# Patient Record
Sex: MALE | Race: AMERICAN INDIAN OR ALASKA NATIVE
[De-identification: names, ages, dates, MRNs, and addresses within clinical notes are randomized per-mention and may not be internally consistent; named-entity substitution may affect disease eponyms.]

---

## 2017-11-18 ENCOUNTER — HOSPITAL ENCOUNTER (EMERGENCY)
Dept: HOSPITAL 5 - ED | Age: 52
Discharge: LEFT BEFORE DISCHARGE | End: 2017-11-18
Payer: OTHER GOVERNMENT

## 2017-11-18 VITALS — SYSTOLIC BLOOD PRESSURE: 131 MMHG | DIASTOLIC BLOOD PRESSURE: 74 MMHG

## 2017-11-18 DIAGNOSIS — E78.00: ICD-10-CM

## 2017-11-18 DIAGNOSIS — L03.011: Primary | ICD-10-CM

## 2017-11-18 DIAGNOSIS — I10: ICD-10-CM

## 2017-11-18 LAB
ANION GAP SERPL CALC-SCNC: 18 MMOL/L
BASOPHILS NFR BLD AUTO: 0.9 % (ref 0–1.8)
BUN SERPL-MCNC: 8 MG/DL (ref 9–20)
BUN/CREAT SERPL: 16 %
CALCIUM SERPL-MCNC: 8.7 MG/DL (ref 8.4–10.2)
CHLORIDE SERPL-SCNC: 101.2 MMOL/L (ref 98–107)
CO2 SERPL-SCNC: 24 MMOL/L (ref 22–30)
EOSINOPHIL NFR BLD AUTO: 3.1 % (ref 0–4.3)
GLUCOSE SERPL-MCNC: 86 MG/DL (ref 75–100)
HCT VFR BLD CALC: 38.2 % (ref 35.5–45.6)
HGB BLD-MCNC: 12.7 GM/DL (ref 11.8–15.2)
MCH RBC QN AUTO: 30 PG (ref 28–32)
MCHC RBC AUTO-ENTMCNC: 33 % (ref 32–34)
MCV RBC AUTO: 89 FL (ref 84–94)
PLATELET # BLD: 308 K/MM3 (ref 140–440)
POTASSIUM SERPL-SCNC: 3.8 MMOL/L (ref 3.6–5)
RBC # BLD AUTO: 4.28 M/MM3 (ref 3.65–5.03)
SODIUM SERPL-SCNC: 139 MMOL/L (ref 137–145)
WBC # BLD AUTO: 6.9 K/MM3 (ref 4.5–11)

## 2017-11-18 PROCEDURE — 85025 COMPLETE CBC W/AUTO DIFF WBC: CPT

## 2017-11-18 PROCEDURE — 90471 IMMUNIZATION ADMIN: CPT

## 2017-11-18 PROCEDURE — 87040 BLOOD CULTURE FOR BACTERIA: CPT

## 2017-11-18 PROCEDURE — 82140 ASSAY OF AMMONIA: CPT

## 2017-11-18 PROCEDURE — 80048 BASIC METABOLIC PNL TOTAL CA: CPT

## 2017-11-18 PROCEDURE — 85652 RBC SED RATE AUTOMATED: CPT

## 2017-11-18 PROCEDURE — 90715 TDAP VACCINE 7 YRS/> IM: CPT

## 2017-11-18 PROCEDURE — 36415 COLL VENOUS BLD VENIPUNCTURE: CPT

## 2017-11-18 PROCEDURE — 86140 C-REACTIVE PROTEIN: CPT

## 2017-11-18 NOTE — XRAY REPORT
RIGHT HAND, 3 views:



History: Right thumb injury, pain and swelling.



The bony architecture is intact. No acute fracture is detected. 

Apparent chronic bony injury to the proximal phalanx of the fourth 

digit is noted. Bony alignment is normal. No

soft tissue abnormalities are seen.  The joint spaces appear

preserved.



IMPRESSION:

No acute injury is appreciated.

## 2017-11-18 NOTE — EMERGENCY DEPARTMENT REPORT
Upper Extremity





- HPI


Chief Complaint: Extremity Injury, Upper


Stated Complaint: INFECTION R THUMB


Time Seen by Provider: 11/18/17 11:29


Upper Extremity: Right Thumb


Occurred When: >5 Days (7 days)


Mechanism: Crush


Severity: moderate


Symptoms: Yes Pain with Movement, Yes Swelling, Yes Bruising/Ecchymosis


Other History: 52-year-old male past medical history hypertension presents with 

pus and drainage from right thumb finger pad extending down through the right 

thumb.  Patient denies fever or chills.  States that he was working with types 

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 

thumb which has subsequently become more painful and swollen over the last 

week.  Patient unaware of tetanus status.  Is able to range thumb but it is 

severely swollen.





ED Review of Systems


ROS: 


Stated complaint: INFECTION R THUMB


Other details as noted in HPI





Constitutional: denies: chills, fever


Eyes: denies: eye pain, eye discharge, vision change


ENT: denies: ear pain, throat pain


Respiratory: denies: cough, shortness of breath, wheezing


Cardiovascular: denies: chest pain, palpitations


Endocrine: no symptoms reported


Gastrointestinal: denies: abdominal pain, nausea, diarrhea


Genitourinary: denies: urgency, dysuria


Musculoskeletal: as per HPI (swollen right thumb).  denies: back pain, joint 

swelling, arthralgia


Skin: denies: rash, lesions


Neurological: denies: headache, weakness, paresthesias


Psychiatric: denies: anxiety, depression


Hematological/Lymphatic: denies: easy bleeding, easy bruising





ED Past Medical Hx





- Past Medical History


Previous Medical History?: Yes


Hx Hypertension: Yes (controlled with diet and exercise)


Additional medical history: high cholesterol





- Surgical History


Past Surgical History?: Yes


Additional Surgical History: 2 right knee surgeries.





- Social History


Smoking Status: Never Smoker


Substance Use Type: None





- Medications


Home Medications: 


 Home Medications











 Medication  Instructions  Recorded  Confirmed  Last Taken  Type


 


Atorvastatin [Lipitor] 40 mg PO QHS 12/28/13 12/28/13 12/27/13 History


 


Cyclobenzaprine HCl [Flexeril 5mg] 5 mg PO BID PRN #15 tablet 07/07/14  Unknown 

Rx


 


Meloxicam [Mobic] 7.5 mg PO QDAY PRN #20 tablet 07/07/14  Unknown Rx


 


Cephalexin [Keflex] 500 mg PO Q12HR #14 cap 11/18/17  Unknown Rx


 


Ibuprofen [Motrin] 800 mg PO Q8HR PRN #30 tablet 11/18/17  Unknown Rx


 


Sulfamethoxazole/Trimethoprim 1 each PO BID #14 tablet 11/18/17  Unknown Rx





[Bactrim DS TAB]     














Upper Extremity Exam





- Exam


General: 


Vital signs noted. No distress. Alert and acting appropriately.





Head and Torso: No HEENT Abnormality, No Neck Tenderness, No Chest/Lungs 

Abnormality, No Abdominal Tenderness, No Back Tenderness


Shoulder Exam: Yes Normal Range of Motion in Shoulder, No Shoulder Tenderness, 

No Clavicle Tenderness, No Shoulder Deformity, No AC Joint Tenderness


Arm Exam: No Arm/Humerus Tenderness, No Arm Deformity


Elbow: No Elbow Tenderness, No Normal Range of Motion in Elbow, No Elbow 

Deformity


Forearm: No Forearm Tenderness, No Forearm Deformity, No Pain with Pronation, 

No Pain with Supination


Wrist: Yes Normal ROM in Wrist, No Wrist Tenderness, No Wrist Deformity, No 

Snuffbox Tenderness, No Pain with Axial Thumb Compression


Hand: Yes Digit Tenderness (right thumb erythema and fluctuance and tenderness 

on the palmar side between the DIP and MCP), No Hand Tenderness, No Hand 

Deformity, No Normal ROM in Digit(s) (Limited thumb flexion), No Digit(s) 

Deformity, No Tendon Dysfunction


CMS Exam: Yes Normal Distal Pulses (less than 1 second), Yes Normal Capillary 

Refill, Yes Normal Distal Sensation, No Broken Skin


Hand L/R Front: 


  __________________________














  __________________________





 1 - Pain swelling, erythema and fluctuance here








ED Course


 Vital Signs











  11/18/17





  10:25


 


Temperature 98.5 F


 


Pulse Rate 63


 


Respiratory 18





Rate 


 


Blood Pressure 131/74


 


O2 Sat by Pulse 97





Oximetry 














ED Medical Decision Making





- Lab Data


Result diagrams: 


 11/18/17 12:23





 11/18/17 12:23





- Medical Decision Making


A/P: Right thumb felon


1-Bactrim and Keflex


2- discussed with Dr. Rodgers


3-I advised patient that he would have to be transferred to a facility with 

orthopedic hand surgeon to have felon properly incised and drain.  Patient 

elected to leave the hospital before I could accomplish this.  I advised the 

patient's return as soon as possible or to go to a hospital that has hand or 

orthopedic surgery on call in order to have procedure done as infection likely 

spread and worsened without drainage of fluctuance inside finger.  Patient 

understood my instructions clearly and signed out AGAINST MEDICAL ADVICE.  I 

advised the patient that he is at risk of permanent disability and/or 

amputation of finger if infection progresses to the bone and that he is also at 

risk of sepsis and death.  Patient understood this conversation and stated he 

would follow up ASAP. 


4- Motrin when necessary


Critical care attestation.: 


If time is entered above; I have spent that time in minutes in the direct care 

of this critically ill patient, excluding procedure time.








ED Disposition


Clinical Impression: 


 Felon of finger of right hand, Left against medical advice





Disposition: DC-07 LEFT AGAINST MED ADVICE


Is pt being admited?: No


Does the pt Need Aspirin: No


Condition: Stable


Instructions:  Cellulitis (ED), Abscess (ED)


Additional Instructions: 


I advised patient to seek medical attention as soon as possible and advised him 

that he needs to see an orthopedic physician or hand surgeon for incision of 

his finger infection.  Patient understood my instructions.


Prescriptions: 


Cephalexin [Keflex] 500 mg PO Q12HR #14 cap


Ibuprofen [Motrin] 800 mg PO Q8HR PRN #30 tablet


 PRN Reason: Pain


Sulfamethoxazole/Trimethoprim [Bactrim DS TAB] 1 each PO BID #14 tablet


Referrals: 


[facility] [Provider Group] - 3-5 Days


LEI BRUNO MD [Staff Physician] - 3-5 Days


Forms:  AMA Form


Time of Disposition: 13:51

## 2020-01-30 ENCOUNTER — HOSPITAL ENCOUNTER (EMERGENCY)
Dept: HOSPITAL 5 - ED | Age: 55
Discharge: HOME | End: 2020-01-30
Payer: OTHER GOVERNMENT

## 2020-01-30 VITALS — DIASTOLIC BLOOD PRESSURE: 97 MMHG | SYSTOLIC BLOOD PRESSURE: 152 MMHG

## 2020-01-30 DIAGNOSIS — I10: ICD-10-CM

## 2020-01-30 DIAGNOSIS — Z87.891: ICD-10-CM

## 2020-01-30 DIAGNOSIS — F12.10: ICD-10-CM

## 2020-01-30 DIAGNOSIS — L03.211: ICD-10-CM

## 2020-01-30 DIAGNOSIS — K04.7: Primary | ICD-10-CM

## 2020-01-30 DIAGNOSIS — E78.00: ICD-10-CM

## 2020-01-30 DIAGNOSIS — Z79.899: ICD-10-CM

## 2020-01-30 NOTE — EMERGENCY DEPARTMENT REPORT
ED ENT HPI





- General


Chief complaint: Abdominal Pain


Stated complaint: ALLERGIC REACTION,CRAMPS IN STOMACH


Time Seen by Provider: 01/30/20 08:40


Source: patient


Mode of arrival: Ambulatory


Limitations: No Limitations





- History of Present Illness


Initial comments: 





patient is a 55-year-old male presents emergency room with complaints of a 

dental abscess to the left upper jaw that began a week ago.  Patient states that

he went to the dentist 8 days ago and had a couple root canals performed.  He 

states that he has one root canal left that needs to be completed but was not 

able to complete it that day secondary to a becoming uncomfortable.  States that

the dentist placed him on penicillin, Motrin, hydrocodone.  He states during the

last couple days he has now noticed left-sided facial swelling which he states 

he did not have before.  Patient states that he went to [facility] urgent care 

last night and was given a ceftriaxone injection but they did not change any of 

his other medications.  This morning when he woke up he took a penicillin on 

empty stomach and then began having nausea, feeling overheated, having stomach 

cramps.  He states he is not having no symptoms currently.  he states that the 

facial swelling has not been improving. Past medical history of hypertension.  

He denies any known allergies.





- Related Data


                                Home Medications











 Medication  Instructions  Recorded  Confirmed  Last Taken


 


Atorvastatin [Lipitor] 40 mg PO QHS 12/28/13 12/28/13 12/27/13








                                  Previous Rx's











 Medication  Instructions  Recorded  Last Taken  Type


 


Cyclobenzaprine HCl [Flexeril 5mg] 5 mg PO BID PRN #15 tablet 07/07/14 Unknown 

Rx


 


Meloxicam [Mobic] 7.5 mg PO QDAY PRN #20 tablet 07/07/14 Unknown Rx


 


Ibuprofen [Motrin] 800 mg PO Q8HR PRN #30 tablet 11/18/17 Unknown Rx


 


Sulfamethoxazole/Trimethoprim 1 each PO BID #14 tablet 11/18/17 Unknown Rx





[Bactrim DS TAB]    


 


cephALEXin [Keflex] 500 mg PO Q12HR #14 cap 11/18/17 Unknown Rx


 


Clindamycin [Clindamycin CAP] 450 mg PO TID 7 Days #63 capsule 01/30/20 Unknown 

Rx











                                    Allergies











Allergy/AdvReac Type Severity Reaction Status Date / Time


 


No Known Allergies Allergy   Unverified 12/28/13 09:36














ED Dental HPI





- General


Chief complaint: Abdominal Pain


Stated complaint: ALLERGIC REACTION,CRAMPS IN STOMACH


Time Seen by Provider: 01/30/20 08:40


Source: patient


Mode of arrival: Ambulatory


Limitations: No Limitations





- Related Data


                                Home Medications











 Medication  Instructions  Recorded  Confirmed  Last Taken


 


Atorvastatin [Lipitor] 40 mg PO QHS 12/28/13 12/28/13 12/27/13








                                  Previous Rx's











 Medication  Instructions  Recorded  Last Taken  Type


 


Cyclobenzaprine HCl [Flexeril 5mg] 5 mg PO BID PRN #15 tablet 07/07/14 Unknown 

Rx


 


Meloxicam [Mobic] 7.5 mg PO QDAY PRN #20 tablet 07/07/14 Unknown Rx


 


Ibuprofen [Motrin] 800 mg PO Q8HR PRN #30 tablet 11/18/17 Unknown Rx


 


Sulfamethoxazole/Trimethoprim 1 each PO BID #14 tablet 11/18/17 Unknown Rx





[Bactrim DS TAB]    


 


cephALEXin [Keflex] 500 mg PO Q12HR #14 cap 11/18/17 Unknown Rx


 


Clindamycin [Clindamycin CAP] 450 mg PO TID 7 Days #63 capsule 01/30/20 Unknown 

Rx











                                    Allergies











Allergy/AdvReac Type Severity Reaction Status Date / Time


 


No Known Allergies Allergy   Unverified 12/28/13 09:36














ED Review of Systems


ROS: 


Stated complaint: ALLERGIC REACTION,CRAMPS IN STOMACH


Other details as noted in HPI





Comment: All other systems reviewed and negative





ED Past Medical Hx





- Past Medical History


Previous Medical History?: Yes


Hx Hypertension: Yes (controlled with diet and exercise)


Additional medical history: high cholesterol





- Surgical History


Past Surgical History?: Yes


Additional Surgical History: 2 right knee surgeries.





- Social History


Smoking Status: Former Smoker


Substance Use Type: Marijuana





- Medications


Home Medications: 


                                Home Medications











 Medication  Instructions  Recorded  Confirmed  Last Taken  Type


 


Atorvastatin [Lipitor] 40 mg PO QHS 12/28/13 12/28/13 12/27/13 History


 


Cyclobenzaprine HCl [Flexeril 5mg] 5 mg PO BID PRN #15 tablet 07/07/14  Unknown 

Rx


 


Meloxicam [Mobic] 7.5 mg PO QDAY PRN #20 tablet 07/07/14  Unknown Rx


 


Ibuprofen [Motrin] 800 mg PO Q8HR PRN #30 tablet 11/18/17  Unknown Rx


 


Sulfamethoxazole/Trimethoprim 1 each PO BID #14 tablet 11/18/17  Unknown Rx





[Bactrim DS TAB]     


 


cephALEXin [Keflex] 500 mg PO Q12HR #14 cap 11/18/17  Unknown Rx


 


Clindamycin [Clindamycin CAP] 450 mg PO TID 7 Days #63 capsule 01/30/20  Unknown

Rx














ED Physical Exam





- General


Limitations: No Limitations


General appearance: alert, in no apparent distress





- Head


Head exam: Present: atraumatic, normocephalic





- Eye


Eye exam: Present: normal appearance





- ENT


ENT exam: Present: normal orophraynx, mucous membranes moist, other (a puddy is 

present in the left upper back tooth where the root canal has not yet been 

completed, there is associated edema and TTP present in the left upper gumlin

e/cheek, there is left sided facial edema, uvula is mildine, no uvular edema, no

uvular deviation)





- Respiratory


Respiratory exam: Present: normal lung sounds bilaterally.  Absent: respiratory 

distress, wheezes, rales, rhonchi, stridor, chest wall tenderness, accessory 

muscle use, decreased breath sounds, prolonged expiratory





- Cardiovascular


Cardiovascular Exam: Present: regular rate, normal rhythm, normal heart sounds. 

Absent: systolic murmur, diastolic murmur, rubs, gallop





- Neurological Exam


Neurological exam: Present: alert, oriented X3





- Psychiatric


Psychiatric exam: Present: normal affect, normal mood





- Skin


Skin exam: Present: warm, dry, intact





ED Course


                                   Vital Signs











  01/30/20





  07:00


 


Temperature 98.9 F


 


Pulse Rate 99 H


 


Respiratory 18





Rate 


 


Blood Pressure 152/97


 


O2 Sat by Pulse 99





Oximetry 














ED Medical Decision Making





- Medical Decision Making





patient is a 55-year-old male presents emergency room with complaints of a 

dental abscess to the left upper jaw that began a week ago.  Patient states that

he went to the dentist 8 days ago and had a couple root canals performed.  He 

states that he has one root canal left that needs to be completed but was not 

able to complete it that day secondary to a becoming uncomfortable.  States that

the dentist placed him on penicillin, Motrin, hydrocodone.  He states during the

last couple days he has now noticed left-sided facial swelling which he states 

he did not have before.  Patient states that he went to [facility] urgent care 

last night and was given a ceftriaxone injection but they did not change any of 

his other medications.  This morning when he woke up he took a penicillin on 

empty stomach and then began having nausea, feeling overheated, having stomach 

cramps.  He states he is not having no symptoms currently.  he states that the 

facial swelling has not been improving. Past medical history of hypertension.  

He denies any known allergies. VSS. on exam: a puddy is present in the left 

upper back tooth where the root canal has not yet been completed, there is 

associated edema and TTP present in the left upper gumline/cheek, there is left 

sided facial edema, uvula is mildine, no uvular edema, no uvular deviation.  

Examination consistent with dental abscess and facial cellulitis.  Given that 

patient has already been on penicillin and continues to have abscess will change

patient's medication to clindamycin. advised pt Please take medication as 

prescribed with food. stop taking penicillin and begin taking clindamycin. May 

continue to take the pain medication prescribed by your dentist as needed for 

pain but please also take with food.  follow up with your dentist in the next 3-

5 days.  Return to the emergency room for any new or worsening symptoms.


Critical care attestation.: 


If time is entered above; I have spent that time in minutes in the direct care 

of this critically ill patient, excluding procedure time.








ED Disposition


Clinical Impression: 


 Dental abscess, Facial cellulitis





Disposition: DC-01 TO HOME OR SELFCARE


Is pt being admited?: No


Does the pt Need Aspirin: No


Condition: Stable


Instructions:  Dental Abscess (ED)


Additional Instructions: 


Please take medication as prescribed with food. stop taking penicillin and begin

taking clindamycin. May continue to take the pain medication prescribed by your 

dentist as needed for pain but please also take with food.  follow up with your 

dentist in the next 3-5 days.  Return to the emergency room for any new or 

worsening symptoms.


Prescriptions: 


Clindamycin [Clindamycin CAP] 450 mg PO TID 7 Days #63 capsule


Referrals: 


your, dentist [Other] - 3-5 Days


Time of Disposition: 08:55


Print Language: ENGLISH